# Patient Record
Sex: FEMALE | Race: WHITE | NOT HISPANIC OR LATINO | URBAN - METROPOLITAN AREA
[De-identification: names, ages, dates, MRNs, and addresses within clinical notes are randomized per-mention and may not be internally consistent; named-entity substitution may affect disease eponyms.]

---

## 2019-09-25 ENCOUNTER — AMBULATORY SURGICAL CENTER (OUTPATIENT)
Dept: URBAN - METROPOLITAN AREA SURGERY 2 | Facility: SURGERY | Age: 64
End: 2019-09-25
Payer: COMMERCIAL

## 2019-09-25 ENCOUNTER — AMBULATORY SURGICAL CENTER (OUTPATIENT)
Dept: URBAN - METROPOLITAN AREA SURGERY 2 | Facility: SURGERY | Age: 64
End: 2019-09-25

## 2019-09-25 ENCOUNTER — OFFICE (OUTPATIENT)
Dept: URBAN - METROPOLITAN AREA PATHOLOGY 22 | Facility: PATHOLOGY | Age: 64
End: 2019-09-25
Payer: COMMERCIAL

## 2019-09-25 VITALS
OXYGEN SATURATION: 94 % | SYSTOLIC BLOOD PRESSURE: 114 MMHG | TEMPERATURE: 98.2 F | SYSTOLIC BLOOD PRESSURE: 116 MMHG | SYSTOLIC BLOOD PRESSURE: 114 MMHG | HEART RATE: 97 BPM | SYSTOLIC BLOOD PRESSURE: 116 MMHG | OXYGEN SATURATION: 97 % | HEIGHT: 59 IN | SYSTOLIC BLOOD PRESSURE: 94 MMHG | OXYGEN SATURATION: 93 % | HEART RATE: 90 BPM | TEMPERATURE: 98.2 F | OXYGEN SATURATION: 93 % | WEIGHT: 119 LBS | SYSTOLIC BLOOD PRESSURE: 122 MMHG | DIASTOLIC BLOOD PRESSURE: 65 MMHG | TEMPERATURE: 98.1 F | DIASTOLIC BLOOD PRESSURE: 53 MMHG | OXYGEN SATURATION: 93 % | DIASTOLIC BLOOD PRESSURE: 53 MMHG | SYSTOLIC BLOOD PRESSURE: 94 MMHG | HEART RATE: 90 BPM | OXYGEN SATURATION: 94 % | SYSTOLIC BLOOD PRESSURE: 122 MMHG | DIASTOLIC BLOOD PRESSURE: 54 MMHG | DIASTOLIC BLOOD PRESSURE: 65 MMHG | DIASTOLIC BLOOD PRESSURE: 54 MMHG | OXYGEN SATURATION: 97 % | RESPIRATION RATE: 18 BRPM | HEART RATE: 89 BPM | WEIGHT: 119 LBS | HEART RATE: 96 BPM | DIASTOLIC BLOOD PRESSURE: 58 MMHG | HEART RATE: 89 BPM | HEART RATE: 97 BPM | HEIGHT: 59 IN | TEMPERATURE: 98.2 F | HEART RATE: 90 BPM | DIASTOLIC BLOOD PRESSURE: 53 MMHG | OXYGEN SATURATION: 94 % | SYSTOLIC BLOOD PRESSURE: 122 MMHG | RESPIRATION RATE: 18 BRPM | WEIGHT: 119 LBS | TEMPERATURE: 98.1 F | SYSTOLIC BLOOD PRESSURE: 116 MMHG | DIASTOLIC BLOOD PRESSURE: 54 MMHG | TEMPERATURE: 98.1 F | SYSTOLIC BLOOD PRESSURE: 94 MMHG | HEART RATE: 96 BPM | HEART RATE: 89 BPM | HEIGHT: 59 IN | HEART RATE: 97 BPM | OXYGEN SATURATION: 97 % | RESPIRATION RATE: 18 BRPM | SYSTOLIC BLOOD PRESSURE: 114 MMHG | HEART RATE: 96 BPM | DIASTOLIC BLOOD PRESSURE: 58 MMHG | DIASTOLIC BLOOD PRESSURE: 65 MMHG | DIASTOLIC BLOOD PRESSURE: 58 MMHG

## 2019-09-25 DIAGNOSIS — D12.3 BENIGN NEOPLASM OF TRANSVERSE COLON: ICD-10-CM

## 2019-09-25 DIAGNOSIS — R13.10 DYSPHAGIA, UNSPECIFIED: ICD-10-CM

## 2019-09-25 DIAGNOSIS — K22.2 ESOPHAGEAL OBSTRUCTION: ICD-10-CM

## 2019-09-25 DIAGNOSIS — Z12.11 ENCOUNTER FOR SCREENING FOR MALIGNANT NEOPLASM OF COLON: ICD-10-CM

## 2019-09-25 DIAGNOSIS — K57.30 DIVERTICULOSIS OF LARGE INTESTINE WITHOUT PERFORATION OR ABS: ICD-10-CM

## 2019-09-25 DIAGNOSIS — K21.0 GASTRO-ESOPHAGEAL REFLUX DISEASE WITH ESOPHAGITIS: ICD-10-CM

## 2019-09-25 DIAGNOSIS — K31.7 POLYP OF STOMACH AND DUODENUM: ICD-10-CM

## 2019-09-25 DIAGNOSIS — Z83.71 FAMILY HISTORY OF COLONIC POLYPS: ICD-10-CM

## 2019-09-25 PROBLEM — D37.8 NEOPLASM OF UNCERTAIN BEHAVIOR OF OTH DIGESTIVE ORGANS: Status: ACTIVE | Noted: 2019-09-25

## 2019-09-25 PROBLEM — K20.9 ESOPHAGITIS, UNSPECIFIED: Status: ACTIVE | Noted: 2019-09-25

## 2019-09-25 PROCEDURE — 88305 TISSUE EXAM BY PATHOLOGIST: CPT | Performed by: INTERNAL MEDICINE

## 2019-09-25 PROCEDURE — 43239 EGD BIOPSY SINGLE/MULTIPLE: CPT | Mod: 59 | Performed by: INTERNAL MEDICINE

## 2019-09-25 PROCEDURE — 43249 ESOPH EGD DILATION <30 MM: CPT | Mod: 51 | Performed by: INTERNAL MEDICINE

## 2019-09-25 PROCEDURE — 45380 COLONOSCOPY AND BIOPSY: CPT | Mod: 33 | Performed by: INTERNAL MEDICINE

## 2019-09-25 PROCEDURE — 88313 SPECIAL STAINS GROUP 2: CPT | Performed by: INTERNAL MEDICINE

## 2019-09-25 RX ORDER — PANTOPRAZOLE 40 MG/1
TABLET, DELAYED RELEASE ORAL
Qty: 90 | Refills: 3 | Status: ACTIVE

## 2019-12-27 ENCOUNTER — OFFICE (OUTPATIENT)
Dept: URBAN - METROPOLITAN AREA CLINIC 19 | Facility: CLINIC | Age: 64
End: 2019-12-27

## 2019-12-27 VITALS
HEIGHT: 59 IN | DIASTOLIC BLOOD PRESSURE: 71 MMHG | SYSTOLIC BLOOD PRESSURE: 122 MMHG | HEART RATE: 87 BPM | WEIGHT: 126 LBS

## 2019-12-27 DIAGNOSIS — R14.2 ERUCTATION: ICD-10-CM

## 2019-12-27 DIAGNOSIS — K22.2 ESOPHAGEAL OBSTRUCTION: ICD-10-CM

## 2019-12-27 DIAGNOSIS — K21.0 GASTRO-ESOPHAGEAL REFLUX DISEASE WITH ESOPHAGITIS: ICD-10-CM

## 2019-12-27 DIAGNOSIS — Z86.010 PERSONAL HISTORY OF COLONIC POLYPS: ICD-10-CM

## 2019-12-27 PROCEDURE — 99213 OFFICE O/P EST LOW 20 MIN: CPT | Performed by: INTERNAL MEDICINE

## 2019-12-27 RX ORDER — PANTOPRAZOLE 40 MG/1
TABLET, DELAYED RELEASE ORAL
Qty: 90 | Refills: 3 | Status: ACTIVE

## 2024-03-27 ENCOUNTER — OFFICE (OUTPATIENT)
Dept: URBAN - METROPOLITAN AREA CLINIC 19 | Facility: CLINIC | Age: 69
End: 2024-03-27
Payer: COMMERCIAL

## 2024-03-27 VITALS
HEIGHT: 59 IN | DIASTOLIC BLOOD PRESSURE: 78 MMHG | OXYGEN SATURATION: 99 % | WEIGHT: 135 LBS | SYSTOLIC BLOOD PRESSURE: 130 MMHG | HEART RATE: 88 BPM

## 2024-03-27 DIAGNOSIS — K57.90 DIVERTICULOSIS OF INTESTINE, PART UNSPECIFIED, WITHOUT PERFO: ICD-10-CM

## 2024-03-27 DIAGNOSIS — Z83.719 FAMILY HISTORY OF COLON POLYPS, UNSPECIFIED: ICD-10-CM

## 2024-03-27 DIAGNOSIS — Z86.010 PERSONAL HISTORY OF COLONIC POLYPS: ICD-10-CM

## 2024-03-27 DIAGNOSIS — R10.32 LEFT LOWER QUADRANT PAIN: ICD-10-CM

## 2024-03-27 PROBLEM — Z83.71 FAMILY HISTORY OF COLONIC POLYPS: Status: ACTIVE | Noted: 2019-09-25

## 2024-03-27 PROCEDURE — 99204 OFFICE O/P NEW MOD 45 MIN: CPT | Performed by: NURSE PRACTITIONER

## 2024-03-27 RX ORDER — CIPROFLOXACIN HYDROCHLORIDE 500 MG/1
TABLET, FILM COATED ORAL
Qty: 20 | Refills: 0 | Status: COMPLETED
Start: 2024-03-27 | End: 2024-07-23

## 2024-03-27 RX ORDER — METRONIDAZOLE 500 MG/1
TABLET, FILM COATED ORAL
Qty: 30 | Refills: 0 | Status: COMPLETED
Start: 2024-03-27 | End: 2024-07-23

## 2024-03-27 NOTE — SERVICEHPINOTES
PREVIOUS HISTORY BY DR. MORRELL 2019:nazario lange Mrs. Penny is a 64 year old female formerly seen by Dr. Manuel, seen for endoscopic evaluation of recall screening with FHx (brother with 14 polyps), last LO in 2013 with no polyps. Also for endoscopic evaluation of solid food dysphagia and known history of a Schatzki's ring.EGD with a stricture dilated to 20 mm.  Also with grade a esophagitis.  Since the dilation she has had no further dysphagia. Since starting the pantoprazole she is doing well. She recalls having midsternal chest pains which she realizes has resolved since the PPI.Colonoscopy with internal hemorrhoids and mild diverticulosis of the descending colon and sigmoid colon. Small TA removed, 5 year recall planned.Only issue is some occasional belching.  Denies drinking significant carbonated drinks, denies chewing gum or using a straw.
br   Recommendations included pantoprazole, diaphragmatic breathing. 
nazario lange UPDATE BY ELENA JASSO 3/27/24:
nazario lange Ms. Penny presents to clinic today with c/o acute onset abdominal pain in LLQ.
br
[place]  States she actually felt in the middle of the night and woke up in horrible pain.  She has had pain like this in the past but this seems worse.  Her baseline bowel regimen can be several loose stools per day.  She did take antibiotics recently for urinary tract infection maybe 2 or 3 months ago. Loose stool preceded the abx. nazario Lynch melena, hematochezia, hematemesis, coffee-ground emesis, constipation, rectal pain, dysphagia, odynophagia, fever, dysuria, nausea/vomiting.  She is eating food but ate a salad last night which made her pain much worse.  She does have history of mesh from a bladder suspension.  No well water at home.  No recent lab work.  She stopped taking NSAIDs during this pain.

## 2024-03-27 NOTE — SERVICENOTES
After CT, will determine timing of colonoscopy. She is due for recall 9/2024. Sent to Dr. Monae for review. I will send in empiric abx for her, traveling out of town next Tuesday. PCN allergy.

## 2024-04-16 ENCOUNTER — OFFICE (OUTPATIENT)
Dept: URBAN - METROPOLITAN AREA CLINIC 19 | Facility: CLINIC | Age: 69
End: 2024-04-16
Payer: COMMERCIAL

## 2024-04-16 VITALS
HEART RATE: 96 BPM | DIASTOLIC BLOOD PRESSURE: 77 MMHG | SYSTOLIC BLOOD PRESSURE: 140 MMHG | WEIGHT: 127 LBS | HEIGHT: 59 IN | OXYGEN SATURATION: 95 %

## 2024-04-16 DIAGNOSIS — Z86.010 PERSONAL HISTORY OF COLONIC POLYPS: ICD-10-CM

## 2024-04-16 DIAGNOSIS — K57.92 DIVERTICULITIS OF INTESTINE, PART UNSPECIFIED, WITHOUT PERFO: ICD-10-CM

## 2024-04-16 DIAGNOSIS — N39.0 URINARY TRACT INFECTION, SITE NOT SPECIFIED: ICD-10-CM

## 2024-04-16 PROCEDURE — 99214 OFFICE O/P EST MOD 30 MIN: CPT | Performed by: NURSE PRACTITIONER

## 2024-04-16 RX ORDER — SODIUM PICOSULFATE, MAGNESIUM OXIDE, AND ANHYDROUS CITRIC ACID 12; 3.5; 1 G/175ML; G/175ML; MG/175ML
LIQUID ORAL
Qty: 1 | Refills: 0 | Status: COMPLETED
Start: 2024-04-16 | End: 2024-07-23

## 2024-04-16 NOTE — SERVICEHPINOTES
PREVIOUS HISTORY BY DR. MORRELL 2019:Mrs. Penny is a 64 year old female formerly seen by Dr. Manuel, seen for endoscopic evaluation of recall screening with FHx (brother with 14 polyps), last LO in 2013 with no polyps. Also for endoscopic evaluation of solid food dysphagia and known history of a Schatzki's ring.EGD with a stricture dilated to 20 mm.  Also with grade a esophagitis.  Since the dilation she has had no further dysphagia. Since starting the pantoprazole she is doing well. She recalls having midsternal chest pains which she realizes has resolved since the PPI.Colonoscopy with internal hemorrhoids and mild diverticulosis of the descending colon and sigmoid colon. Small TA removed, 5 year recall planned. Only issue is some occasional belching.  Denies drinking significant carbonated drinks, denies chewing gum or using a straw.brRecommendations included pantoprazole, diaphragmatic breathing.UPDATE BY ELENA JASSO 3/27/24:Ms. Penny presents to clinic today with c/o acute onset abdominal pain in LLQ.[place]  States she actually felt in the middle of the night and woke up in horrible pain.  She has had pain like this in the past but this seems worse.  Her baseline bowel regimen can be several loose stools per day.  She did take antibiotics recently for urinary tract infection maybe 2 or 3 months ago. Loose stool preceded the abx. No melena, hematochezia, hematemesis, coffee-ground emesis, constipation, rectal pain, dysphagia, odynophagia, fever, dysuria, nausea/vomiting.  She is eating food but ate a salad last night which made her pain much worse.  She does have history of mesh from a bladder suspension.  No well water at home.  No recent lab work.  She stopped taking NSAIDs during this pain. 
br
br   Recommendations included Cipro/Flagyl, CT AP (not done), low fiber diet, drink fluids, celiac, stool C&ampS, UA, CMP, CBC. UA with bacteria and WBC's present. All other labs negative.
br
br
UPDATE BY ELENA JASSO 4/16/24:
br 
Geovanny Penny presents to clinic today for hospital follow-up, diverticulitis. She was inpatient at [facility], admitted on Easter Sunday. She received IVF, IV abx, CT revealed uncomplicated diverticulitis per patient, had a UTI/sepsis. She saw urology as outpatient due to bladder not emptying well. Placed on Tamsulosin. 
br
brShe is feeling a lot better.  She is eating solid food, we did discuss doing a low-fiber diet.  No melena, hematochezia, hematemesis, coffee-ground emesis, fever, nausea/vomiting, abdominal pain, rectal pain, diarrhea, constipation.  She is not taking any NSAIDs.  She has already completed her home antibiotics.

## 2024-04-16 NOTE — SERVICENOTES
Sent to Dr. Monae for review. No perforation or abscess on CT per patient but I will request those records.

## 2024-07-23 ENCOUNTER — AMBULATORY SURGICAL CENTER (OUTPATIENT)
Dept: URBAN - METROPOLITAN AREA SURGERY 2 | Facility: SURGERY | Age: 69
End: 2024-07-23
Payer: COMMERCIAL

## 2024-07-23 ENCOUNTER — OFFICE (OUTPATIENT)
Dept: URBAN - METROPOLITAN AREA PATHOLOGY 12 | Facility: PATHOLOGY | Age: 69
End: 2024-07-23
Payer: COMMERCIAL

## 2024-07-23 VITALS
DIASTOLIC BLOOD PRESSURE: 62 MMHG | SYSTOLIC BLOOD PRESSURE: 100 MMHG | DIASTOLIC BLOOD PRESSURE: 67 MMHG | DIASTOLIC BLOOD PRESSURE: 78 MMHG | OXYGEN SATURATION: 96 % | SYSTOLIC BLOOD PRESSURE: 134 MMHG | TEMPERATURE: 97.7 F | RESPIRATION RATE: 18 BRPM | DIASTOLIC BLOOD PRESSURE: 46 MMHG | SYSTOLIC BLOOD PRESSURE: 95 MMHG | HEART RATE: 67 BPM | WEIGHT: 115 LBS | HEART RATE: 64 BPM | TEMPERATURE: 97.7 F | HEIGHT: 59 IN | DIASTOLIC BLOOD PRESSURE: 78 MMHG | DIASTOLIC BLOOD PRESSURE: 38 MMHG | OXYGEN SATURATION: 97 % | WEIGHT: 115 LBS | SYSTOLIC BLOOD PRESSURE: 145 MMHG | HEART RATE: 75 BPM | HEART RATE: 60 BPM | SYSTOLIC BLOOD PRESSURE: 134 MMHG | SYSTOLIC BLOOD PRESSURE: 100 MMHG | RESPIRATION RATE: 18 BRPM | DIASTOLIC BLOOD PRESSURE: 46 MMHG | HEART RATE: 67 BPM | HEART RATE: 69 BPM | DIASTOLIC BLOOD PRESSURE: 64 MMHG | HEART RATE: 60 BPM | HEART RATE: 64 BPM | HEIGHT: 59 IN | SYSTOLIC BLOOD PRESSURE: 164 MMHG | DIASTOLIC BLOOD PRESSURE: 62 MMHG | DIASTOLIC BLOOD PRESSURE: 38 MMHG | SYSTOLIC BLOOD PRESSURE: 164 MMHG | SYSTOLIC BLOOD PRESSURE: 145 MMHG | TEMPERATURE: 98.4 F | TEMPERATURE: 98.4 F | OXYGEN SATURATION: 96 % | DIASTOLIC BLOOD PRESSURE: 64 MMHG | HEART RATE: 69 BPM | OXYGEN SATURATION: 97 % | HEART RATE: 75 BPM | DIASTOLIC BLOOD PRESSURE: 67 MMHG | SYSTOLIC BLOOD PRESSURE: 95 MMHG

## 2024-07-23 DIAGNOSIS — K57.30 DIVERTICULOSIS OF LARGE INTESTINE WITHOUT PERFORATION OR ABS: ICD-10-CM

## 2024-07-23 DIAGNOSIS — D12.3 BENIGN NEOPLASM OF TRANSVERSE COLON: ICD-10-CM

## 2024-07-23 DIAGNOSIS — Z86.010 PERSONAL HISTORY OF COLONIC POLYPS: ICD-10-CM

## 2024-07-23 DIAGNOSIS — K63.5 POLYP OF COLON: ICD-10-CM

## 2024-07-23 DIAGNOSIS — Z09 ENCOUNTER FOR FOLLOW-UP EXAMINATION AFTER COMPLETED TREATMEN: ICD-10-CM

## 2024-07-23 PROCEDURE — 45380 COLONOSCOPY AND BIOPSY: CPT | Mod: PT | Performed by: STUDENT IN AN ORGANIZED HEALTH CARE EDUCATION/TRAINING PROGRAM

## 2024-07-23 PROCEDURE — 88305 TISSUE EXAM BY PATHOLOGIST: CPT | Performed by: PATHOLOGY

## 2024-07-23 NOTE — SERVICEHPINOTES
Ms. Jade Penny is a 67 yo female with PMH colon polyps and FHx of colon polyps who presents for surveillance colonoscopy.
br
br Colonoscopy 7/23/24:    
br 
not on blood thinners, still has occassional abdominal pain, not on nsaids, last bm was clear, FH++colon polyps and personal history of polyps. Following BRAT diet.br
br

## 2024-07-23 NOTE — SERVICEHPINOTES
Ms. Jade Penny is a 69 yo female with PMH colon polyps and FHx of colon polyps who presents for surveillance colonoscopy.
br
br Colonoscopy 7/23/24:    
br 
not on blood thinners, still has occassional abdominal pain, not on nsaids, last bm was clear, FH++colon polyps and personal history of polyps. Following BRAT diet.br
br

## 2024-07-25 LAB
GASTRO ONE PATHOLOGY: PDF REPORT: (no result)
GASTRO ONE PATHOLOGY: PDF REPORT: (no result)

## 2025-03-31 ENCOUNTER — OFFICE (OUTPATIENT)
Dept: URBAN - METROPOLITAN AREA CLINIC 19 | Facility: CLINIC | Age: 70
End: 2025-03-31
Payer: MEDICARE

## 2025-03-31 DIAGNOSIS — K57.30 DIVERTICULOSIS OF LARGE INTESTINE WITHOUT PERFORATION OR ABS: ICD-10-CM

## 2025-03-31 PROCEDURE — 99426 PRIN CARE MGMT STAFF 1ST 30: CPT | Performed by: NURSE PRACTITIONER
